# Patient Record
Sex: MALE | Race: WHITE | NOT HISPANIC OR LATINO | Employment: OTHER | URBAN - METROPOLITAN AREA
[De-identification: names, ages, dates, MRNs, and addresses within clinical notes are randomized per-mention and may not be internally consistent; named-entity substitution may affect disease eponyms.]

---

## 2023-10-11 RX ORDER — FAMOTIDINE 20 MG/1
1 TABLET, FILM COATED ORAL 2 TIMES DAILY
COMMUNITY
Start: 2022-07-20

## 2023-10-11 RX ORDER — PREDNISONE 20 MG/1
1 TABLET ORAL 2 TIMES DAILY
COMMUNITY
Start: 2022-07-20

## 2023-10-11 RX ORDER — EPINEPHRINE 0.3 MG/.3ML
0.3 INJECTION, SOLUTION INTRAMUSCULAR ONCE
COMMUNITY
Start: 2022-07-20

## 2023-10-11 RX ORDER — DOXYCYCLINE 100 MG/1
1 TABLET ORAL 2 TIMES DAILY
COMMUNITY
Start: 2022-08-09

## 2023-10-12 ENCOUNTER — OFFICE VISIT (OUTPATIENT)
Dept: ORTHOPEDIC SURGERY | Facility: CLINIC | Age: 68
End: 2023-10-12
Payer: MEDICARE

## 2023-10-12 VITALS — WEIGHT: 168 LBS | HEIGHT: 72 IN | BODY MASS INDEX: 22.75 KG/M2

## 2023-10-12 DIAGNOSIS — S69.91XD INJURY OF RIGHT HAND, SUBSEQUENT ENCOUNTER: Primary | ICD-10-CM

## 2023-10-12 PROCEDURE — 1159F MED LIST DOCD IN RCRD: CPT | Performed by: ORTHOPAEDIC SURGERY

## 2023-10-12 PROCEDURE — 99213 OFFICE O/P EST LOW 20 MIN: CPT | Performed by: ORTHOPAEDIC SURGERY

## 2023-10-12 ASSESSMENT — PAIN - FUNCTIONAL ASSESSMENT: PAIN_FUNCTIONAL_ASSESSMENT: NO/DENIES PAIN

## 2023-10-13 NOTE — PROGRESS NOTES
History of Present Illness:  Chief Complaint   Patient presents with    Right Hand - Pain     Right index finger lac        Patient presents today for follow-up of right index finger wound.  He has been doing dressing changes regularly.  No drainage.  Pain improving.    History reviewed. No pertinent past medical history.    Medication Documentation Review Audit       Reviewed by Parisa Martinez CMA (Medical Assistant) on 10/12/23 at 1003      Medication Order Taking? Sig Documenting Provider Last Dose Status   doxycycline (Adoxa) 100 mg tablet [id number] No Take 1 tablet (100 mg) by mouth 2 times a day. Historical Provider, MD Not Taking Active   EPINEPHrine (Auvi-Q) 0.3 mg/0.3 mL injection syringe [id number] No Inject 0.3 mL (0.3 mg) into the shoulder, thigh, or buttocks 1 time. Historical Provider, MD Not Taking Active   famotidine (Pepcid) 20 mg tablet [id number] No Take 1 tablet (20 mg) by mouth twice a day. Historical Provider, MD Not Taking Active   predniSONE (Deltasone) 20 mg tablet [id number] No Take 1 tablet (20 mg) by mouth twice a day. Historical Provider, MD Not Taking Active                    No Known Allergies    Social History     Socioeconomic History    Marital status:      Spouse name: Not on file    Number of children: Not on file    Years of education: Not on file    Highest education level: Not on file   Occupational History    Not on file   Tobacco Use    Smoking status: Not on file    Smokeless tobacco: Not on file   Substance and Sexual Activity    Alcohol use: Not on file    Drug use: Not on file    Sexual activity: Not on file   Other Topics Concern    Not on file   Social History Narrative    Not on file     Social Determinants of Health     Financial Resource Strain: Not on file   Food Insecurity: Not on file   Transportation Needs: Not on file   Physical Activity: Not on file   Stress: Not on file   Social Connections: Not on file   Intimate Partner Violence: Not on file   Housing  Stability: Not on file       History reviewed. No pertinent surgical history.     Review of Systems   GENERAL: Negative for malaise, significant weight loss, fever  MUSCULOSKELETAL: see HPI  NEURO:  Negative     Physical Examination  Constitutional: Appears well-developed and well-nourished.  Head: Normocephalic and atraumatic.  Eyes: EOMI grossly  Cardiovascular: Intact distal pulses.   Respiratory: Effort normal. No respiratory distress.  Neurologic: Alert and oriented to person, place, and time.  Skin: Skin is warm and dry.  Hematologic / Lymphatic: No lymphedema, lymphangitis.  Psychiatric: normal mood and affect. Behavior is normal.   Musculoskeletal:  Right index finger: Wound is well-healing with good granulation tissue centrally and evidence of epithelialization peripherally.  Capillary refill less than 2 seconds distally.  0 cm DPC.  FDS/FDP and terminal extensor intact.     Assessment:  Patient with healing right index finger wound     Plan:  Right index finger wound is healing well.  No signs of infection.  We discussed recommendation for moisturizing lotion like Aquaphor to be applied sparingly a few times a day.  He will continue use of bandage when more active, but otherwise does not need a bandage at this point.  If any issues or concerns with the healing process they will call for further follow-up.

## 2024-04-22 ENCOUNTER — LAB (OUTPATIENT)
Dept: LAB | Facility: LAB | Age: 69
End: 2024-04-22
Payer: MEDICARE

## 2024-04-22 DIAGNOSIS — E78.2 MIXED HYPERLIPIDEMIA: Primary | ICD-10-CM

## 2024-04-22 DIAGNOSIS — R97.20 ELEVATED PROSTATE SPECIFIC ANTIGEN (PSA): ICD-10-CM

## 2024-04-22 LAB
ALBUMIN SERPL BCP-MCNC: 4.4 G/DL (ref 3.4–5)
ALP SERPL-CCNC: 88 U/L (ref 33–136)
ALT SERPL W P-5'-P-CCNC: 16 U/L (ref 10–52)
ANION GAP SERPL CALC-SCNC: 10 MMOL/L (ref 10–20)
AST SERPL W P-5'-P-CCNC: 23 U/L (ref 9–39)
BASOPHILS # BLD AUTO: 0.06 X10*3/UL (ref 0–0.1)
BASOPHILS NFR BLD AUTO: 0.7 %
BILIRUB SERPL-MCNC: 0.7 MG/DL (ref 0–1.2)
BUN SERPL-MCNC: 14 MG/DL (ref 6–23)
CALCIUM SERPL-MCNC: 9.6 MG/DL (ref 8.6–10.3)
CHLORIDE SERPL-SCNC: 107 MMOL/L (ref 98–107)
CHOLEST SERPL-MCNC: 203 MG/DL (ref 0–199)
CHOLESTEROL/HDL RATIO: 3.1
CO2 SERPL-SCNC: 29 MMOL/L (ref 21–32)
CREAT SERPL-MCNC: 1.06 MG/DL (ref 0.5–1.3)
EGFRCR SERPLBLD CKD-EPI 2021: 76 ML/MIN/1.73M*2
EOSINOPHIL # BLD AUTO: 0.16 X10*3/UL (ref 0–0.7)
EOSINOPHIL NFR BLD AUTO: 1.9 %
ERYTHROCYTE [DISTWIDTH] IN BLOOD BY AUTOMATED COUNT: 13 % (ref 11.5–14.5)
GLUCOSE SERPL-MCNC: 86 MG/DL (ref 74–99)
HCT VFR BLD AUTO: 49.9 % (ref 41–52)
HDLC SERPL-MCNC: 65.2 MG/DL
HGB BLD-MCNC: 16.2 G/DL (ref 13.5–17.5)
IMM GRANULOCYTES # BLD AUTO: 0.02 X10*3/UL (ref 0–0.7)
IMM GRANULOCYTES NFR BLD AUTO: 0.2 % (ref 0–0.9)
LDLC SERPL CALC-MCNC: 126 MG/DL
LYMPHOCYTES # BLD AUTO: 2.87 X10*3/UL (ref 1.2–4.8)
LYMPHOCYTES NFR BLD AUTO: 34.7 %
MCH RBC QN AUTO: 31 PG (ref 26–34)
MCHC RBC AUTO-ENTMCNC: 32.5 G/DL (ref 32–36)
MCV RBC AUTO: 96 FL (ref 80–100)
MONOCYTES # BLD AUTO: 0.68 X10*3/UL (ref 0.1–1)
MONOCYTES NFR BLD AUTO: 8.2 %
NEUTROPHILS # BLD AUTO: 4.48 X10*3/UL (ref 1.2–7.7)
NEUTROPHILS NFR BLD AUTO: 54.3 %
NON HDL CHOLESTEROL: 138 MG/DL (ref 0–149)
NRBC BLD-RTO: 0 /100 WBCS (ref 0–0)
PLATELET # BLD AUTO: 292 X10*3/UL (ref 150–450)
POTASSIUM SERPL-SCNC: 5 MMOL/L (ref 3.5–5.3)
PROT SERPL-MCNC: 6.7 G/DL (ref 6.4–8.2)
RBC # BLD AUTO: 5.22 X10*6/UL (ref 4.5–5.9)
SODIUM SERPL-SCNC: 141 MMOL/L (ref 136–145)
TRIGL SERPL-MCNC: 60 MG/DL (ref 0–149)
TSH SERPL-ACNC: 2.64 MIU/L (ref 0.44–3.98)
VLDL: 12 MG/DL (ref 0–40)
WBC # BLD AUTO: 8.3 X10*3/UL (ref 4.4–11.3)

## 2024-04-22 PROCEDURE — 80053 COMPREHEN METABOLIC PANEL: CPT

## 2024-04-22 PROCEDURE — 80061 LIPID PANEL: CPT

## 2024-04-22 PROCEDURE — 84153 ASSAY OF PSA TOTAL: CPT

## 2024-04-22 PROCEDURE — 85025 COMPLETE CBC W/AUTO DIFF WBC: CPT

## 2024-04-22 PROCEDURE — 84443 ASSAY THYROID STIM HORMONE: CPT

## 2024-04-22 PROCEDURE — 36415 COLL VENOUS BLD VENIPUNCTURE: CPT

## 2024-04-23 LAB — PSA SERPL-MCNC: 1.16 NG/ML

## 2025-05-28 ENCOUNTER — APPOINTMENT (OUTPATIENT)
Dept: CARDIOLOGY | Facility: HOSPITAL | Age: 70
End: 2025-05-28
Payer: MEDICARE

## 2025-05-28 ENCOUNTER — HOSPITAL ENCOUNTER (OUTPATIENT)
Facility: HOSPITAL | Age: 70
Setting detail: OBSERVATION
Discharge: HOME | End: 2025-05-29
Attending: EMERGENCY MEDICINE | Admitting: NURSE PRACTITIONER
Payer: MEDICARE

## 2025-05-28 ENCOUNTER — APPOINTMENT (OUTPATIENT)
Dept: RADIOLOGY | Facility: HOSPITAL | Age: 70
End: 2025-05-28
Payer: MEDICARE

## 2025-05-28 DIAGNOSIS — S40.011A CONTUSION OF RIGHT SHOULDER, INITIAL ENCOUNTER: ICD-10-CM

## 2025-05-28 DIAGNOSIS — R07.9 ACUTE CHEST PAIN: ICD-10-CM

## 2025-05-28 DIAGNOSIS — I48.91 NEW ONSET ATRIAL FIBRILLATION (MULTI): ICD-10-CM

## 2025-05-28 DIAGNOSIS — S09.90XA INJURY OF HEAD, INITIAL ENCOUNTER: ICD-10-CM

## 2025-05-28 DIAGNOSIS — R55 SYNCOPE, UNSPECIFIED SYNCOPE TYPE: Primary | ICD-10-CM

## 2025-05-28 LAB
ALBUMIN SERPL BCP-MCNC: 4.4 G/DL (ref 3.4–5)
ALP SERPL-CCNC: 84 U/L (ref 33–136)
ALT SERPL W P-5'-P-CCNC: 17 U/L (ref 10–52)
ANION GAP SERPL CALC-SCNC: 9 MMOL/L (ref 10–20)
AORTIC VALVE PEAK VELOCITY: 1.21 M/S
APTT PPP: 30 SECONDS (ref 26–36)
AST SERPL W P-5'-P-CCNC: 24 U/L (ref 9–39)
ATRIAL RATE: 111 BPM
ATRIAL RATE: 150 BPM
ATRIAL RATE: 77 BPM
AV PEAK GRADIENT: 6 MMHG
AVA (PEAK VEL): 2.47 CM2
BASOPHILS # BLD AUTO: 0.06 X10*3/UL (ref 0–0.1)
BASOPHILS NFR BLD AUTO: 0.7 %
BILIRUB SERPL-MCNC: 0.7 MG/DL (ref 0–1.2)
BUN SERPL-MCNC: 22 MG/DL (ref 6–23)
CALCIUM SERPL-MCNC: 9.3 MG/DL (ref 8.6–10.3)
CARDIAC TROPONIN I PNL SERPL HS: 5 NG/L (ref 0–20)
CARDIAC TROPONIN I PNL SERPL HS: 5 NG/L (ref 0–20)
CHLORIDE SERPL-SCNC: 106 MMOL/L (ref 98–107)
CO2 SERPL-SCNC: 28 MMOL/L (ref 21–32)
CREAT SERPL-MCNC: 1.13 MG/DL (ref 0.5–1.3)
EGFRCR SERPLBLD CKD-EPI 2021: 70 ML/MIN/1.73M*2
EJECTION FRACTION APICAL 4 CHAMBER: 65.2
EJECTION FRACTION: 68 %
EOSINOPHIL # BLD AUTO: 0.15 X10*3/UL (ref 0–0.7)
EOSINOPHIL NFR BLD AUTO: 1.7 %
ERYTHROCYTE [DISTWIDTH] IN BLOOD BY AUTOMATED COUNT: 12.9 % (ref 11.5–14.5)
GLUCOSE SERPL-MCNC: 109 MG/DL (ref 74–99)
HCT VFR BLD AUTO: 47.4 % (ref 41–52)
HGB BLD-MCNC: 15.7 G/DL (ref 13.5–17.5)
HOLD SPECIMEN: NORMAL
IMM GRANULOCYTES # BLD AUTO: 0.04 X10*3/UL (ref 0–0.7)
IMM GRANULOCYTES NFR BLD AUTO: 0.5 % (ref 0–0.9)
INR PPP: 1 (ref 0.9–1.1)
LEFT ATRIUM VOLUME AREA LENGTH INDEX BSA: 22.9 ML/M2
LEFT VENTRICLE INTERNAL DIMENSION DIASTOLE: 4.44 CM (ref 3.5–6)
LEFT VENTRICULAR OUTFLOW TRACT DIAMETER: 1.89 CM
LYMPHOCYTES # BLD AUTO: 4.84 X10*3/UL (ref 1.2–4.8)
LYMPHOCYTES NFR BLD AUTO: 54.6 %
MAGNESIUM SERPL-MCNC: 1.89 MG/DL (ref 1.6–2.4)
MCH RBC QN AUTO: 31.1 PG (ref 26–34)
MCHC RBC AUTO-ENTMCNC: 33.1 G/DL (ref 32–36)
MCV RBC AUTO: 94 FL (ref 80–100)
MITRAL VALVE E/A RATIO: 1.19
MONOCYTES # BLD AUTO: 0.7 X10*3/UL (ref 0.1–1)
MONOCYTES NFR BLD AUTO: 7.9 %
NEUTROPHILS # BLD AUTO: 3.07 X10*3/UL (ref 1.2–7.7)
NEUTROPHILS NFR BLD AUTO: 34.6 %
NRBC BLD-RTO: 0 /100 WBCS (ref 0–0)
P AXIS: 74 DEGREES
P OFFSET: 202 MS
P ONSET: 135 MS
PLATELET # BLD AUTO: 262 X10*3/UL (ref 150–450)
POTASSIUM SERPL-SCNC: 3.5 MMOL/L (ref 3.5–5.3)
PR INTERVAL: 164 MS
PROT SERPL-MCNC: 6.3 G/DL (ref 6.4–8.2)
PROTHROMBIN TIME: 10.9 SECONDS (ref 9.8–12.4)
Q ONSET: 217 MS
QRS COUNT: 12 BEATS
QRS COUNT: 17 BEATS
QRS COUNT: 19 BEATS
QRS DURATION: 100 MS
QRS DURATION: 100 MS
QRS DURATION: 104 MS
QT INTERVAL: 294 MS
QT INTERVAL: 334 MS
QT INTERVAL: 370 MS
QTC CALCULATION(BAZETT): 397 MS
QTC CALCULATION(BAZETT): 418 MS
QTC CALCULATION(BAZETT): 439 MS
QTC FREDERICIA: 360 MS
QTC FREDERICIA: 401 MS
QTC FREDERICIA: 402 MS
R AXIS: 81 DEGREES
R AXIS: 82 DEGREES
R AXIS: 84 DEGREES
RBC # BLD AUTO: 5.05 X10*6/UL (ref 4.5–5.9)
RIGHT VENTRICLE FREE WALL PEAK S': 13 CM/S
RIGHT VENTRICLE PEAK SYSTOLIC PRESSURE: 30 MMHG
SODIUM SERPL-SCNC: 139 MMOL/L (ref 136–145)
T AXIS: 37 DEGREES
T AXIS: 60 DEGREES
T AXIS: 67 DEGREES
T OFFSET: 364 MS
T OFFSET: 384 MS
T OFFSET: 402 MS
T4 FREE SERPL-MCNC: 0.79 NG/DL (ref 0.61–1.12)
TRICUSPID ANNULAR PLANE SYSTOLIC EXCURSION: 2.7 CM
TSH SERPL-ACNC: 5.3 MIU/L (ref 0.44–3.98)
VENTRICULAR RATE: 104 BPM
VENTRICULAR RATE: 110 BPM
VENTRICULAR RATE: 77 BPM
WBC # BLD AUTO: 8.9 X10*3/UL (ref 4.4–11.3)

## 2025-05-28 PROCEDURE — 71045 X-RAY EXAM CHEST 1 VIEW: CPT

## 2025-05-28 PROCEDURE — 2500000004 HC RX 250 GENERAL PHARMACY W/ HCPCS (ALT 636 FOR OP/ED): Mod: JZ | Performed by: NURSE PRACTITIONER

## 2025-05-28 PROCEDURE — 73030 X-RAY EXAM OF SHOULDER: CPT | Mod: RT

## 2025-05-28 PROCEDURE — 99285 EMERGENCY DEPT VISIT HI MDM: CPT | Mod: 25 | Performed by: EMERGENCY MEDICINE

## 2025-05-28 PROCEDURE — 70450 CT HEAD/BRAIN W/O DYE: CPT | Performed by: RADIOLOGY

## 2025-05-28 PROCEDURE — 93005 ELECTROCARDIOGRAM TRACING: CPT

## 2025-05-28 PROCEDURE — 84439 ASSAY OF FREE THYROXINE: CPT | Performed by: EMERGENCY MEDICINE

## 2025-05-28 PROCEDURE — 85025 COMPLETE CBC W/AUTO DIFF WBC: CPT | Performed by: EMERGENCY MEDICINE

## 2025-05-28 PROCEDURE — 94760 N-INVAS EAR/PLS OXIMETRY 1: CPT

## 2025-05-28 PROCEDURE — 93306 TTE W/DOPPLER COMPLETE: CPT

## 2025-05-28 PROCEDURE — 2500000001 HC RX 250 WO HCPCS SELF ADMINISTERED DRUGS (ALT 637 FOR MEDICARE OP): Performed by: EMERGENCY MEDICINE

## 2025-05-28 PROCEDURE — 76536 US EXAM OF HEAD AND NECK: CPT | Performed by: RADIOLOGY

## 2025-05-28 PROCEDURE — 71045 X-RAY EXAM CHEST 1 VIEW: CPT | Mod: RIGHT SIDE | Performed by: RADIOLOGY

## 2025-05-28 PROCEDURE — 80053 COMPREHEN METABOLIC PANEL: CPT | Performed by: EMERGENCY MEDICINE

## 2025-05-28 PROCEDURE — 84443 ASSAY THYROID STIM HORMONE: CPT | Performed by: EMERGENCY MEDICINE

## 2025-05-28 PROCEDURE — G0378 HOSPITAL OBSERVATION PER HR: HCPCS

## 2025-05-28 PROCEDURE — 83036 HEMOGLOBIN GLYCOSYLATED A1C: CPT | Mod: CONLAB | Performed by: PHYSICIAN ASSISTANT

## 2025-05-28 PROCEDURE — 36415 COLL VENOUS BLD VENIPUNCTURE: CPT | Performed by: EMERGENCY MEDICINE

## 2025-05-28 PROCEDURE — 99222 1ST HOSP IP/OBS MODERATE 55: CPT | Performed by: NURSE PRACTITIONER

## 2025-05-28 PROCEDURE — 73030 X-RAY EXAM OF SHOULDER: CPT | Mod: RIGHT SIDE | Performed by: RADIOLOGY

## 2025-05-28 PROCEDURE — 85610 PROTHROMBIN TIME: CPT | Performed by: EMERGENCY MEDICINE

## 2025-05-28 PROCEDURE — 84484 ASSAY OF TROPONIN QUANT: CPT | Performed by: EMERGENCY MEDICINE

## 2025-05-28 PROCEDURE — 83735 ASSAY OF MAGNESIUM: CPT | Performed by: EMERGENCY MEDICINE

## 2025-05-28 PROCEDURE — 93306 TTE W/DOPPLER COMPLETE: CPT | Performed by: INTERNAL MEDICINE

## 2025-05-28 PROCEDURE — 70450 CT HEAD/BRAIN W/O DYE: CPT

## 2025-05-28 PROCEDURE — 85730 THROMBOPLASTIN TIME PARTIAL: CPT | Performed by: EMERGENCY MEDICINE

## 2025-05-28 PROCEDURE — 2500000004 HC RX 250 GENERAL PHARMACY W/ HCPCS (ALT 636 FOR OP/ED): Mod: JZ | Performed by: EMERGENCY MEDICINE

## 2025-05-28 PROCEDURE — 96372 THER/PROPH/DIAG INJ SC/IM: CPT | Performed by: NURSE PRACTITIONER

## 2025-05-28 PROCEDURE — 76536 US EXAM OF HEAD AND NECK: CPT

## 2025-05-28 PROCEDURE — 96375 TX/PRO/DX INJ NEW DRUG ADDON: CPT

## 2025-05-28 PROCEDURE — 96374 THER/PROPH/DIAG INJ IV PUSH: CPT | Mod: 59

## 2025-05-28 RX ORDER — ONDANSETRON 4 MG/1
4 TABLET, ORALLY DISINTEGRATING ORAL EVERY 8 HOURS PRN
Status: DISCONTINUED | OUTPATIENT
Start: 2025-05-28 | End: 2025-05-29 | Stop reason: HOSPADM

## 2025-05-28 RX ORDER — ACETAMINOPHEN 325 MG/1
650 TABLET ORAL EVERY 6 HOURS PRN
Status: DISCONTINUED | OUTPATIENT
Start: 2025-05-28 | End: 2025-05-29 | Stop reason: HOSPADM

## 2025-05-28 RX ORDER — NAPROXEN SODIUM 220 MG/1
162 TABLET, FILM COATED ORAL ONCE
Status: COMPLETED | OUTPATIENT
Start: 2025-05-28 | End: 2025-05-28

## 2025-05-28 RX ORDER — ENOXAPARIN SODIUM 100 MG/ML
40 INJECTION SUBCUTANEOUS EVERY 24 HOURS
Status: DISCONTINUED | OUTPATIENT
Start: 2025-05-28 | End: 2025-05-29

## 2025-05-28 RX ORDER — POLYETHYLENE GLYCOL 3350 17 G/17G
17 POWDER, FOR SOLUTION ORAL DAILY
Status: DISCONTINUED | OUTPATIENT
Start: 2025-05-28 | End: 2025-05-29 | Stop reason: HOSPADM

## 2025-05-28 RX ORDER — ONDANSETRON HYDROCHLORIDE 2 MG/ML
4 INJECTION, SOLUTION INTRAVENOUS EVERY 8 HOURS PRN
Status: DISCONTINUED | OUTPATIENT
Start: 2025-05-28 | End: 2025-05-29 | Stop reason: HOSPADM

## 2025-05-28 RX ORDER — ONDANSETRON HYDROCHLORIDE 2 MG/ML
4 INJECTION, SOLUTION INTRAVENOUS ONCE
Status: COMPLETED | OUTPATIENT
Start: 2025-05-28 | End: 2025-05-28

## 2025-05-28 RX ORDER — CALCIUM CARBONATE 200(500)MG
500 TABLET,CHEWABLE ORAL 4 TIMES DAILY PRN
Status: DISCONTINUED | OUTPATIENT
Start: 2025-05-28 | End: 2025-05-29 | Stop reason: HOSPADM

## 2025-05-28 RX ORDER — MORPHINE SULFATE 4 MG/ML
4 INJECTION INTRAVENOUS ONCE
Status: COMPLETED | OUTPATIENT
Start: 2025-05-28 | End: 2025-05-28

## 2025-05-28 RX ADMIN — ASPIRIN 162 MG: 81 TABLET, CHEWABLE ORAL at 06:17

## 2025-05-28 RX ADMIN — MORPHINE SULFATE 4 MG: 4 INJECTION, SOLUTION INTRAMUSCULAR; INTRAVENOUS at 05:34

## 2025-05-28 RX ADMIN — ONDANSETRON 4 MG: 2 INJECTION INTRAMUSCULAR; INTRAVENOUS at 05:33

## 2025-05-28 RX ADMIN — ENOXAPARIN SODIUM 40 MG: 40 INJECTION SUBCUTANEOUS at 20:16

## 2025-05-28 SDOH — SOCIAL STABILITY: SOCIAL INSECURITY: DO YOU FEEL ANYONE HAS EXPLOITED OR TAKEN ADVANTAGE OF YOU FINANCIALLY OR OF YOUR PERSONAL PROPERTY?: NO

## 2025-05-28 SDOH — SOCIAL STABILITY: SOCIAL INSECURITY: WITHIN THE LAST YEAR, HAVE YOU BEEN AFRAID OF YOUR PARTNER OR EX-PARTNER?: NO

## 2025-05-28 SDOH — SOCIAL STABILITY: SOCIAL INSECURITY
WITHIN THE LAST YEAR, HAVE YOU BEEN KICKED, HIT, SLAPPED, OR OTHERWISE PHYSICALLY HURT BY YOUR PARTNER OR EX-PARTNER?: NO

## 2025-05-28 SDOH — ECONOMIC STABILITY: INCOME INSECURITY: IN THE PAST 12 MONTHS HAS THE ELECTRIC, GAS, OIL, OR WATER COMPANY THREATENED TO SHUT OFF SERVICES IN YOUR HOME?: NO

## 2025-05-28 SDOH — SOCIAL STABILITY: SOCIAL INSECURITY: ARE YOU OR HAVE YOU BEEN THREATENED OR ABUSED PHYSICALLY, EMOTIONALLY, OR SEXUALLY BY ANYONE?: NO

## 2025-05-28 SDOH — SOCIAL STABILITY: SOCIAL INSECURITY: DOES ANYONE TRY TO KEEP YOU FROM HAVING/CONTACTING OTHER FRIENDS OR DOING THINGS OUTSIDE YOUR HOME?: NO

## 2025-05-28 SDOH — ECONOMIC STABILITY: FOOD INSECURITY: WITHIN THE PAST 12 MONTHS, YOU WORRIED THAT YOUR FOOD WOULD RUN OUT BEFORE YOU GOT THE MONEY TO BUY MORE.: NEVER TRUE

## 2025-05-28 SDOH — SOCIAL STABILITY: SOCIAL INSECURITY: WITHIN THE LAST YEAR, HAVE YOU BEEN HUMILIATED OR EMOTIONALLY ABUSED IN OTHER WAYS BY YOUR PARTNER OR EX-PARTNER?: NO

## 2025-05-28 SDOH — SOCIAL STABILITY: SOCIAL INSECURITY
WITHIN THE LAST YEAR, HAVE YOU BEEN RAPED OR FORCED TO HAVE ANY KIND OF SEXUAL ACTIVITY BY YOUR PARTNER OR EX-PARTNER?: NO

## 2025-05-28 SDOH — ECONOMIC STABILITY: FOOD INSECURITY: WITHIN THE PAST 12 MONTHS, THE FOOD YOU BOUGHT JUST DIDN'T LAST AND YOU DIDN'T HAVE MONEY TO GET MORE.: NEVER TRUE

## 2025-05-28 SDOH — SOCIAL STABILITY: SOCIAL INSECURITY: ARE THERE ANY APPARENT SIGNS OF INJURIES/BEHAVIORS THAT COULD BE RELATED TO ABUSE/NEGLECT?: NO

## 2025-05-28 SDOH — SOCIAL STABILITY: SOCIAL INSECURITY: HAVE YOU HAD ANY THOUGHTS OF HARMING ANYONE ELSE?: NO

## 2025-05-28 SDOH — SOCIAL STABILITY: SOCIAL INSECURITY: HAS ANYONE EVER THREATENED TO HURT YOUR FAMILY OR YOUR PETS?: NO

## 2025-05-28 SDOH — SOCIAL STABILITY: SOCIAL INSECURITY: DO YOU FEEL UNSAFE GOING BACK TO THE PLACE WHERE YOU ARE LIVING?: NO

## 2025-05-28 SDOH — SOCIAL STABILITY: SOCIAL INSECURITY: ABUSE: ADULT

## 2025-05-28 SDOH — SOCIAL STABILITY: SOCIAL INSECURITY: HAVE YOU HAD THOUGHTS OF HARMING ANYONE ELSE?: NO

## 2025-05-28 SDOH — SOCIAL STABILITY: SOCIAL INSECURITY: WERE YOU ABLE TO COMPLETE ALL THE BEHAVIORAL HEALTH SCREENINGS?: YES

## 2025-05-28 ASSESSMENT — ACTIVITIES OF DAILY LIVING (ADL)
ADEQUATE_TO_COMPLETE_ADL: YES
PATIENT'S MEMORY ADEQUATE TO SAFELY COMPLETE DAILY ACTIVITIES?: YES
BATHING: INDEPENDENT
HEARING - LEFT EAR: FUNCTIONAL
GROOMING: INDEPENDENT
HEARING - RIGHT EAR: FUNCTIONAL
LACK_OF_TRANSPORTATION: NO
TOILETING: INDEPENDENT
WALKS IN HOME: INDEPENDENT
JUDGMENT_ADEQUATE_SAFELY_COMPLETE_DAILY_ACTIVITIES: YES
FEEDING YOURSELF: INDEPENDENT
DRESSING YOURSELF: INDEPENDENT

## 2025-05-28 ASSESSMENT — PAIN SCALES - GENERAL
PAINLEVEL_OUTOF10: 3
PAINLEVEL_OUTOF10: 5 - MODERATE PAIN
PAINLEVEL_OUTOF10: 5 - MODERATE PAIN
PAINLEVEL_OUTOF10: 2
PAINLEVEL_OUTOF10: 0 - NO PAIN
PAINLEVEL_OUTOF10: 2
PAINLEVEL_OUTOF10: 6
PAINLEVEL_OUTOF10: 0 - NO PAIN
PAINLEVEL_OUTOF10: 0 - NO PAIN
PAINLEVEL_OUTOF10: 2
PAINLEVEL_OUTOF10: 6
PAINLEVEL_OUTOF10: 6
PAINLEVEL_OUTOF10: 0 - NO PAIN
PAINLEVEL_OUTOF10: 3
PAINLEVEL_OUTOF10: 0 - NO PAIN

## 2025-05-28 ASSESSMENT — HEART SCORE
RISK FACTORS: 1-2 RISK FACTORS
TROPONIN: LESS THAN OR EQUAL TO NORMAL LIMIT
HEART SCORE: 4
ECG: NON-SPECIFIC REPOLARIZATION DISTURBANCE
HISTORY: SLIGHTLY SUSPICIOUS
AGE: 65+

## 2025-05-28 ASSESSMENT — LIFESTYLE VARIABLES
HOW MANY STANDARD DRINKS CONTAINING ALCOHOL DO YOU HAVE ON A TYPICAL DAY: PATIENT DOES NOT DRINK
HOW OFTEN DO YOU HAVE 6 OR MORE DRINKS ON ONE OCCASION: NEVER
AUDIT-C TOTAL SCORE: 0
HOW OFTEN DO YOU HAVE A DRINK CONTAINING ALCOHOL: NEVER
SKIP TO QUESTIONS 9-10: 1
AUDIT-C TOTAL SCORE: 0

## 2025-05-28 ASSESSMENT — PAIN DESCRIPTION - FREQUENCY
FREQUENCY: CONSTANT/CONTINUOUS
FREQUENCY: CONSTANT/CONTINUOUS

## 2025-05-28 ASSESSMENT — COLUMBIA-SUICIDE SEVERITY RATING SCALE - C-SSRS
6. HAVE YOU EVER DONE ANYTHING, STARTED TO DO ANYTHING, OR PREPARED TO DO ANYTHING TO END YOUR LIFE?: NO
2. HAVE YOU ACTUALLY HAD ANY THOUGHTS OF KILLING YOURSELF?: NO
1. IN THE PAST MONTH, HAVE YOU WISHED YOU WERE DEAD OR WISHED YOU COULD GO TO SLEEP AND NOT WAKE UP?: NO

## 2025-05-28 ASSESSMENT — COGNITIVE AND FUNCTIONAL STATUS - GENERAL
MOBILITY SCORE: 24
PATIENT BASELINE BEDBOUND: NO
DAILY ACTIVITIY SCORE: 24
DAILY ACTIVITIY SCORE: 24
MOBILITY SCORE: 24

## 2025-05-28 ASSESSMENT — PAIN DESCRIPTION - DESCRIPTORS
DESCRIPTORS: ACHING
DESCRIPTORS: SORE

## 2025-05-28 ASSESSMENT — PAIN DESCRIPTION - PROGRESSION: CLINICAL_PROGRESSION: GRADUALLY IMPROVING

## 2025-05-28 ASSESSMENT — PAIN DESCRIPTION - LOCATION
LOCATION: SHOULDER
LOCATION: SHOULDER

## 2025-05-28 ASSESSMENT — PAIN - FUNCTIONAL ASSESSMENT
PAIN_FUNCTIONAL_ASSESSMENT: 0-10

## 2025-05-28 ASSESSMENT — PATIENT HEALTH QUESTIONNAIRE - PHQ9
2. FEELING DOWN, DEPRESSED OR HOPELESS: NOT AT ALL
SUM OF ALL RESPONSES TO PHQ9 QUESTIONS 1 & 2: 0
1. LITTLE INTEREST OR PLEASURE IN DOING THINGS: NOT AT ALL

## 2025-05-28 ASSESSMENT — PAIN DESCRIPTION - PAIN TYPE
TYPE: ACUTE PAIN
TYPE: ACUTE PAIN

## 2025-05-28 ASSESSMENT — PAIN DESCRIPTION - ORIENTATION
ORIENTATION: RIGHT
ORIENTATION: RIGHT

## 2025-05-28 NOTE — PROGRESS NOTES
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 Yes   Type of Animals or Pets dog and cat   Home or Post Acute Services None   Expected Discharge Disposition Home   Does the patient need discharge transport arranged? No     Diagnosis: syncope    Patient Concerns: None    Patient Needs: None

## 2025-05-28 NOTE — ED PROVIDER NOTES
HPI   Chief Complaint   Patient presents with   • Chest Pain   • Syncope   • Shoulder Pain         History provided by:  Medical records and patient   used: No        This patient presents to the emergency department ambulatory via private vehicle for evaluation after syncopal episode at home.  Patient reports that he had fallen asleep on the couch and he woke up to use the bathroom.  He said he started to walk to the bathroom started feel hot and sweaty and then woke up on the floor.  He says since then he has noticed the pain in his right shoulder and right side of his chest that feels musculoskeletal to him.  He sustained a laceration to his forehead with the fall.  His tetanus is up-to-date.    Patient denies any nausea, vomiting.  No recent fevers, chills, coughs, URI symptoms.  He is not on any blood thinners.  Patient states he takes no medications on a regular basis.  He did take 2 baby aspirin prior to coming to the hospital.    Patient History   Medical History[1]  Surgical History[2]  Family History[3]  Social History[4]    Physical Exam   ED Triage Vitals [05/28/25 0414]   Temperature Heart Rate Respirations BP   36.4 °C (97.6 °F) (!) 110 18 (!) 130/92      SpO2 Temp Source Heart Rate Source Patient Position   100 % Temporal Monitor --      BP Location FiO2 (%)     -- --       Physical Exam  Vitals reviewed.   Constitutional:       Appearance: He is well-developed.   HENT:      Head: Normocephalic.      Comments: No raccoon's eyes, no Ramirez sign.  Superficial laceration left forehead.  No hematoma.  No active bleeding.  Abrasion nasal bridge.  Eyes:      Extraocular Movements: Extraocular movements intact.      Pupils: Pupils are equal, round, and reactive to light.      Comments: No orbital rim crepitance, step-off, deformity   Cardiovascular:      Rate and Rhythm: Tachycardia present. Rhythm irregular.      Pulses:           Carotid pulses are 2+ on the right side and 2+ on the  left side.       Radial pulses are 2+ on the right side and 2+ on the left side.        Dorsalis pedis pulses are 2+ on the right side and 2+ on the left side.        Posterior tibial pulses are 2+ on the right side and 2+ on the left side.      Heart sounds: Normal heart sounds.   Pulmonary:      Effort: Pulmonary effort is normal.      Breath sounds: Normal breath sounds.   Chest:      Chest wall: Tenderness present.      Comments: Right upper chest  Abdominal:      General: Bowel sounds are normal.      Palpations: Abdomen is soft.   Musculoskeletal:         General: Normal range of motion.      Cervical back: Normal range of motion and neck supple.      Right lower leg: No edema.      Left lower leg: No edema.      Comments: Tender right shoulder.  No acromioclavicular step-off or deformity.   Skin:     General: Skin is warm and dry.      Capillary Refill: Capillary refill takes less than 2 seconds.   Neurological:      General: No focal deficit present.      Mental Status: He is alert and oriented to person, place, and time.      Cranial Nerves: No cranial nerve deficit.      Motor: No weakness.   Psychiatric:         Mood and Affect: Mood normal.           ED Course & MDM   ED Course as of 05/28/25 0714   Wed May 28, 2025   0547 Patient reassessed, feeling better, shoulder just sore with movement   [MN]      ED Course User Index  [MN] Henny Mack MD         Diagnoses as of 05/28/25 0714   Syncope, unspecified syncope type   New onset atrial fibrillation (Multi)   Acute chest pain   Injury of head, initial encounter   Contusion of right shoulder, initial encounter          ED Medication Administration from 05/28/2025 0405 to 05/28/2025 0555         Date/Time Order Dose Route Action Action by     05/28/2025 0533 EDT ondansetron (Zofran) injection 4 mg 4 mg intravenous Given ANALY Joy     05/28/2025 0534 EDT morphine injection 4 mg 4 mg intravenous Given ANALY Joy          Labs Reviewed   CBC WITH AUTO  DIFFERENTIAL - Abnormal       Result Value    WBC 8.9      nRBC 0.0      RBC 5.05      Hemoglobin 15.7      Hematocrit 47.4      MCV 94      MCH 31.1      MCHC 33.1      RDW 12.9      Platelets 262      Neutrophils % 34.6      Immature Granulocytes %, Automated 0.5      Lymphocytes % 54.6      Monocytes % 7.9      Eosinophils % 1.7      Basophils % 0.7      Neutrophils Absolute 3.07      Immature Granulocytes Absolute, Automated 0.04      Lymphocytes Absolute 4.84 (*)     Monocytes Absolute 0.70      Eosinophils Absolute 0.15      Basophils Absolute 0.06     COMPREHENSIVE METABOLIC PANEL - Abnormal    Glucose 109 (*)     Sodium 139      Potassium 3.5      Chloride 106      Bicarbonate 28      Anion Gap 9 (*)     Urea Nitrogen 22      Creatinine 1.13      eGFR 70      Calcium 9.3      Albumin 4.4      Alkaline Phosphatase 84      Total Protein 6.3 (*)     AST 24      Bilirubin, Total 0.7      ALT 17     TSH WITH REFLEX TO FREE T4 IF ABNORMAL - Abnormal    Thyroid Stimulating Hormone 5.30 (*)     Narrative:     TSH testing is performed using different testing methodology at [facility] than at other [facility]. Direct result comparisons should only be made within the same method.     SERIAL TROPONIN-INITIAL - Normal    Troponin I, High Sensitivity 5      Narrative:     Less than 99th percentile of normal range cutoff-  Female and children under 18 years old <14 ng/L; Male <21 ng/L: Negative  Repeat testing should be performed if clinically indicated.     Female and children under 18 years old 14-50 ng/L; Male 21-50 ng/L:  Consistent with possible cardiac damage and possible increased clinical   risk. Serial measurements may help to assess extent of myocardial damage.     >50 ng/L: Consistent with cardiac damage, increased clinical risk and  myocardial infarction. Serial measurements may help assess extent of   myocardial damage.      NOTE: Children less than 1 year old may have higher baseline  troponin   levels and results should be interpreted in conjunction with the overall   clinical context.     NOTE: Troponin I testing is performed using a different   testing methodology at [facility] than at other   [facility]. Direct result comparisons should only   be made within the same method.   PROTIME-INR - Normal    Protime 10.9      INR 1.0     APTT - Normal    aPTT 30      Narrative:     The APTT is no longer used for monitoring Unfractionated Heparin Therapy. For monitoring Heparin Therapy, use the Heparin Assay.   SERIAL TROPONIN, 1 HOUR - Normal    Troponin I, High Sensitivity 5      Narrative:     Less than 99th percentile of normal range cutoff-  Female and children under 18 years old <14 ng/L; Male <21 ng/L: Negative  Repeat testing should be performed if clinically indicated.     Female and children under 18 years old 14-50 ng/L; Male 21-50 ng/L:  Consistent with possible cardiac damage and possible increased clinical   risk. Serial measurements may help to assess extent of myocardial damage.     >50 ng/L: Consistent with cardiac damage, increased clinical risk and  myocardial infarction. Serial measurements may help assess extent of   myocardial damage.      NOTE: Children less than 1 year old may have higher baseline troponin   levels and results should be interpreted in conjunction with the overall   clinical context.     NOTE: Troponin I testing is performed using a different   testing methodology at [facility] than at other   [facility]. Direct result comparisons should only   be made within the same method.   MAGNESIUM - Normal    Magnesium 1.89     THYROXINE, FREE - Normal    Thyroxine, Free 0.79      Narrative:     Thyroxine Free testing is performed using different testing methodology at [facility] than at other [facility]. Direct result comparisons should only be made within the same method.    Biotin can cause falsely elevated  free T4 results. Patients taking a Biotin dose of up to 10 mg/day should refrain from taking Biotin for 24 hours before sample collection. Patient taking a Biotin dose of >10 mg/day should consult with their physician or the laboratory before the blood draw.   TROPONIN SERIES- (INITIAL, 1 HR)    Narrative:     The following orders were created for panel order Troponin I Series, High Sensitivity (0, 1 HR).  Procedure                               Abnormality         Status                     ---------                               -----------         ------                     Troponin I, High Sensitiv...[[id number]]  Normal              Final result               Troponin, High Sensitivit...[[id number]]  Normal              Final result                 Please view results for these tests on the individual orders.   GRAY TOP     CT head wo IV contrast   Final Result   1.  No acute intracranial hemorrhage or depressed calvarial fracture.                       MACRO:   None.        Signed by: Sophia Mosher 5/28/2025 6:53 AM   Dictation workstation:   [id number]      XR chest 1 view    (Results Pending)   XR shoulder right 2+ views    (Results Pending)   This patient presents to the emergency department with the above history and physical.  No signs of sepsis, dehydration, stroke, seizure           No data recorded     [place] Coma Scale Score: 15 (05/28/25 0419 : Stacey Joy RN)                   EKG  0411 --twelve-lead EKG was obtained and read by me. This demonstrates atrial fibrillation with heart rate of 110, incomplete right bundle branch block pattern,, no ischemia, no pericarditis. There was no   prior EKG.  0513 --twelve-lead EKG was obtained and read by me. This demonstrates atrial fibrillation with a rate of 104, no ischemia, no pericarditis. There was no change compared to most recent prior EKG.  0536 --twelve-lead EKG was obtained and read by me. This demonstrates normal sinus rhythm with a rate of 77,  normal intervals, normal axes, no ectopy, no ischemia, no pericarditis. Compared to most recent prior EKG, normal sinus rhythm has replaced atrial fibrillation.        Medical Decision Making  This patient presents to the emergency department with the above history and physical.  No signs of sepsis, dehydration, stroke, seizure.  Patient in atrial fibrillation with rapid ventricular response which is new according to patient.  He had already taken 2 baby aspirin at home was given additional 2 baby aspirin.  He was given morphine and Zofran for his complaints of pain.    Due to syncope with head injury CT scan of brain was obtained and read by radiology demonstrating no acute intracranial process.    Over the course of emergency department stay patient spontaneously converted to a normal sinus rhythm.  Troponin is negative x 2.  No significant electrolyte abnormalities.  TSH elevated but normal free T4    X-rays of chest and shoulder obtained due to reports of pain after the syncopal episode. These were also read by radiology demonstrating cardiomegaly, biapical pleural scarring, no acute fracture or dislocation of the right shoulder degenerative changes at the acromioclavicular joint.    Patient will require hospitalization for further evaluation and treatment.  Epic secure communication initiated with DIVINE Ramirez for Dr. Dsouza of the hospitalist service including patient's past medical history, presenting signs and symptoms, current clinical condition and test results. She has graciously accepted the patient.    Procedure  Procedures           [1]  History reviewed. No pertinent past medical history.  [2]  History reviewed. No pertinent surgical history.  [3]  No family history on file.  [4]  Social History  Tobacco Use   • Smoking status: Every Day     Current packs/day: 1.50     Types: Cigarettes   • Smokeless tobacco: Not on file   Substance Use Topics   • Alcohol use: Never   • Drug use: Never      Henny  STEVAN Mack MD  05/28/25 0714

## 2025-05-28 NOTE — CARE PLAN
The patient's goals for the shift include      The clinical goals for the shift include Patient's HR will remain <100 bpm    Patient progressing towards goals. Alert and oriented. Vitals stable. Sinus margarita. No complaints of chest pain, sob, dizziness. Continue to monitor. Call light within reach.

## 2025-05-28 NOTE — ED TRIAGE NOTES
Pt arrives to ER via private vehicle. Pt reports woke up to use the bathroom and as he was walking to the bathroom started to feel hot and sweaty. Pt states he then woke up on the floor. Pt states noticed right shoulder pain that radiates into his upper back and chest. Pt denies blood thinners. Pt has abrasion to forehead. Pt Aox4. MD michael in room with pt.

## 2025-05-28 NOTE — H&P
History of Present Illness  Titi Rowley is a 69 y.o. male  with PMHx significant for nicotine dependence who presented to [facility] due to syncope. Patient expressed that he was sleeping on the couch when he got up to go to the bathroom, became dizzy and then the next he knew, he woke up on the floor. He did strike his head causing a laceration and has right sided shoulder and rib pain that is reproducible with palpation and movement. Patient and female in the room expressed that he has been having intermittent fluttering feelings in his chest recently. Patient denies fever, chills, recent travel, or sick contacts. Patient currently does not take any medications and has not had a cardiac work up in about 20 years.   On arrival to ER, he was found to be in atrial fibrillation. He did self convert about 45 minutes after arrival without intervention. His Ed work up is benign including a CMP, CBC, TSH, CT head, CXR, and right shoulder x-ray. Monitor has been SB since conversion. Cardiology consult, ECHO, thyroid US ordered on admission.     12 Point ROS negative unless noted in above HPI     Past Medical History  Medical History[1]    Surgical History  Surgical History[2]     Social History  He reports that he has been smoking cigarettes. He does not have any smokeless tobacco history on file. He reports that he does not drink alcohol and does not use drugs.    Allergies  Patient has no known allergies.     Physical Exam  Constitutional:       Appearance: Normal appearance. He is not ill-appearing.   HENT:      Head: Atraumatic.      Nose: Nose normal.      Mouth/Throat:      Mouth: Mucous membranes are moist.   Eyes:      Pupils: Pupils are equal, round, and reactive to light.   Cardiovascular:      Rate and Rhythm: Normal rate and regular rhythm.   Pulmonary:      Effort: Pulmonary effort is normal.   Abdominal:      General: Bowel sounds are normal.      Palpations: Abdomen is soft.   Musculoskeletal:          General: Normal range of motion.   Skin:     General: Skin is warm and dry.   Neurological:      General: No focal deficit present.      Mental Status: He is alert and oriented to person, place, and time.   Psychiatric:         Mood and Affect: Mood normal.         Behavior: Behavior normal.          Last Recorded Vitals  Blood pressure 154/80, pulse 52, temperature 36.5 °C (97.7 °F), resp. rate 18, height 1.829 m (6'), weight 74.2 kg (163 lb 9.3 oz), SpO2 97%.    Relevant Results  Scheduled medications  Scheduled Medications[3]  Continuous medications  Continuous Medications[4]  PRN medications  PRN Medications[5]     Results for orders placed or performed during the hospital encounter of 05/28/25 (from the past 24 hours)   CBC with Differential   Result Value Ref Range    WBC 8.9 4.4 - 11.3 x10*3/uL    nRBC 0.0 0.0 - 0.0 /100 WBCs    RBC 5.05 4.50 - 5.90 x10*6/uL    Hemoglobin 15.7 13.5 - 17.5 g/dL    Hematocrit 47.4 41.0 - 52.0 %    MCV 94 80 - 100 fL    MCH 31.1 26.0 - 34.0 pg    MCHC 33.1 32.0 - 36.0 g/dL    RDW 12.9 11.5 - 14.5 %    Platelets 262 150 - 450 x10*3/uL    Neutrophils % 34.6 40.0 - 80.0 %    Immature Granulocytes %, Automated 0.5 0.0 - 0.9 %    Lymphocytes % 54.6 13.0 - 44.0 %    Monocytes % 7.9 2.0 - 10.0 %    Eosinophils % 1.7 0.0 - 6.0 %    Basophils % 0.7 0.0 - 2.0 %    Neutrophils Absolute 3.07 1.20 - 7.70 x10*3/uL    Immature Granulocytes Absolute, Automated 0.04 0.00 - 0.70 x10*3/uL    Lymphocytes Absolute 4.84 (H) 1.20 - 4.80 x10*3/uL    Monocytes Absolute 0.70 0.10 - 1.00 x10*3/uL    Eosinophils Absolute 0.15 0.00 - 0.70 x10*3/uL    Basophils Absolute 0.06 0.00 - 0.10 x10*3/uL   Comprehensive Metabolic Panel   Result Value Ref Range    Glucose 109 (H) 74 - 99 mg/dL    Sodium 139 136 - 145 mmol/L    Potassium 3.5 3.5 - 5.3 mmol/L    Chloride 106 98 - 107 mmol/L    Bicarbonate 28 21 - 32 mmol/L    Anion Gap 9 (L) 10 - 20 mmol/L    Urea Nitrogen 22 6 - 23 mg/dL    Creatinine 1.13 0.50 - 1.30  mg/dL    eGFR 70 >60 mL/min/1.73m*2    Calcium 9.3 8.6 - 10.3 mg/dL    Albumin 4.4 3.4 - 5.0 g/dL    Alkaline Phosphatase 84 33 - 136 U/L    Total Protein 6.3 (L) 6.4 - 8.2 g/dL    AST 24 9 - 39 U/L    Bilirubin, Total 0.7 0.0 - 1.2 mg/dL    ALT 17 10 - 52 U/L   Troponin I, High Sensitivity, Initial   Result Value Ref Range    Troponin I, High Sensitivity 5 0 - 20 ng/L   Protime-INR   Result Value Ref Range    Protime 10.9 9.8 - 12.4 seconds    INR 1.0 0.9 - 1.1   APTT   Result Value Ref Range    aPTT 30 26 - 36 seconds   Light Blue Top   Result Value Ref Range    Extra Tube Hold for add-ons.    Red Top   Result Value Ref Range    Extra Tube Hold for add-ons.    TSH with reflex to Free T4 if abnormal   Result Value Ref Range    Thyroid Stimulating Hormone 5.30 (H) 0.44 - 3.98 mIU/L   Magnesium   Result Value Ref Range    Magnesium 1.89 1.60 - 2.40 mg/dL   Thyroxine, Free   Result Value Ref Range    Thyroxine, Free 0.79 0.61 - 1.12 ng/dL   Gray Top   Result Value Ref Range    Extra Tube Hold for add-ons.    Troponin, High Sensitivity, 1 Hour   Result Value Ref Range    Troponin I, High Sensitivity 5 0 - 20 ng/L        Imaging  Imaging  XR chest 1 view  Result Date: 5/28/2025  1.  No radiographic evidence of acute cardiopulmonary process. 2. No acute fracture or dislocation at the right shoulder. Degenerative changes at the AC joint. Calcifications at the supraspinatus tendon, may be seen with calcific tendinitis.       MACRO: None.   Signed by: Sophia Mosher 5/28/2025 6:59 AM Dictation workstation:   [id number]    XR shoulder right 2+ views  Result Date: 5/28/2025  1.  No radiographic evidence of acute cardiopulmonary process. 2. No acute fracture or dislocation at the right shoulder. Degenerative changes at the AC joint. Calcifications at the supraspinatus tendon, may be seen with calcific tendinitis.       MACRO: None.   Signed by: Sophia Mosher 5/28/2025 6:59 AM Dictation workstation:   [id number]    CT  head wo IV contrast  Result Date: 5/28/2025  1.  No acute intracranial hemorrhage or depressed calvarial fracture.         MACRO: None.   Signed by: Sophia Mosher 5/28/2025 6:53 AM Dictation workstation:   [id number]      Cardiology, Vascular, and Other Imaging  No other imaging results found for the past 2 days       Assessment/Plan    Atrial fibrillation  ~notes increased fluttering in his chest that is intermittent  ~symptomatic with syncope  ~self converted to S.margarita  ~ECHO~~  ~thyroid US~~  ~cardiology consult appreciated    Right shoulder/chest pain  Abrasion left forehead  ~2/2 falling to the floor  ~radiology workup neg for fracture or head bleed    Disposition: Admitted 2/2 atrial fibrillation with self conversion to S. Margarita. Cardiology consult.    I spent 45 minutes in the professional and overall care of this patient.      DIVINE Sandy-CNP  Internal Medicine  Attending Attestation:    Patient was seen and examined face to face, history and physical was taken personally at bedside the APRN-CNP, was present for the whole duration of the exam who participated in the documentation of this note. I performed the medical decision-making components (assessment and plan of care). I have reviewed the documentation and verified the findings in the note as written with additions or exceptions as stated in the body of this note.  Patient had a syncopal episode, fell and hit his forehead, was brought to emergency room he was found to be in atrial fibrillation.  But he converted spontaneously, but there is twelve-lead EKG documentation of atrial fibrillation while patient was in the emergency room.  He was seen this morning there is a female person is in the room, she says that patient has been having fluttering in his chest in the past, the patient denies having any chest pain except having some right shoulder pain, no fever chills no nausea vomiting no previous syncopal episode.  On exam there is a small  laceration over forehead above left eyebrow, there is a possible nodule on the left thyroid gland, heart is regular lungs clear to auscultation with scattered rhonchi abdomen soft bowel sounds are present extremity no edema.  There is tenderness over anterior part of the chest, no bruises.  Patient with syncopal episode possibly secondary to A-fib, placed patient in hospital get 2D echo, cardiology consult, trending troponin, check TSH and get ultrasound of thyroid.    Dr. Shannan Owen MD  Internal Medicine        [1] History reviewed. No pertinent past medical history.  [2] History reviewed. No pertinent surgical history.  [3] enoxaparin, 40 mg, subcutaneous, q24h  polyethylene glycol, 17 g, oral, Daily  [4]    [5] PRN medications: acetaminophen, calcium carbonate, ondansetron ODT **OR** ondansetron

## 2025-05-29 VITALS
DIASTOLIC BLOOD PRESSURE: 71 MMHG | HEART RATE: 61 BPM | WEIGHT: 163.58 LBS | HEIGHT: 72 IN | BODY MASS INDEX: 22.16 KG/M2 | OXYGEN SATURATION: 95 % | SYSTOLIC BLOOD PRESSURE: 128 MMHG | TEMPERATURE: 97.9 F | RESPIRATION RATE: 16 BRPM

## 2025-05-29 DIAGNOSIS — R07.9 ACUTE CHEST PAIN: ICD-10-CM

## 2025-05-29 DIAGNOSIS — R55 SYNCOPE, UNSPECIFIED SYNCOPE TYPE: ICD-10-CM

## 2025-05-29 DIAGNOSIS — I48.91 NEW ONSET ATRIAL FIBRILLATION (MULTI): ICD-10-CM

## 2025-05-29 LAB
ANION GAP SERPL CALC-SCNC: 7 MMOL/L (ref 10–20)
BUN SERPL-MCNC: 18 MG/DL (ref 6–23)
CALCIUM SERPL-MCNC: 9 MG/DL (ref 8.6–10.3)
CHLORIDE SERPL-SCNC: 106 MMOL/L (ref 98–107)
CO2 SERPL-SCNC: 28 MMOL/L (ref 21–32)
CREAT SERPL-MCNC: 0.99 MG/DL (ref 0.5–1.3)
EGFRCR SERPLBLD CKD-EPI 2021: 82 ML/MIN/1.73M*2
ERYTHROCYTE [DISTWIDTH] IN BLOOD BY AUTOMATED COUNT: 12.9 % (ref 11.5–14.5)
EST. AVERAGE GLUCOSE BLD GHB EST-MCNC: 117 MG/DL
GLUCOSE SERPL-MCNC: 98 MG/DL (ref 74–99)
HBA1C MFR BLD: 5.7 % (ref ?–5.7)
HCT VFR BLD AUTO: 45.5 % (ref 41–52)
HGB BLD-MCNC: 15 G/DL (ref 13.5–17.5)
MCH RBC QN AUTO: 31 PG (ref 26–34)
MCHC RBC AUTO-ENTMCNC: 33 G/DL (ref 32–36)
MCV RBC AUTO: 94 FL (ref 80–100)
NRBC BLD-RTO: 0 /100 WBCS (ref 0–0)
PLATELET # BLD AUTO: 233 X10*3/UL (ref 150–450)
POTASSIUM SERPL-SCNC: 4.2 MMOL/L (ref 3.5–5.3)
RBC # BLD AUTO: 4.84 X10*6/UL (ref 4.5–5.9)
SODIUM SERPL-SCNC: 137 MMOL/L (ref 136–145)
WBC # BLD AUTO: 8.1 X10*3/UL (ref 4.4–11.3)

## 2025-05-29 PROCEDURE — 94760 N-INVAS EAR/PLS OXIMETRY 1: CPT

## 2025-05-29 PROCEDURE — 85027 COMPLETE CBC AUTOMATED: CPT | Performed by: NURSE PRACTITIONER

## 2025-05-29 PROCEDURE — 99239 HOSP IP/OBS DSCHRG MGMT >30: CPT | Performed by: NURSE PRACTITIONER

## 2025-05-29 PROCEDURE — 80048 BASIC METABOLIC PNL TOTAL CA: CPT | Performed by: NURSE PRACTITIONER

## 2025-05-29 PROCEDURE — G0378 HOSPITAL OBSERVATION PER HR: HCPCS

## 2025-05-29 PROCEDURE — 36415 COLL VENOUS BLD VENIPUNCTURE: CPT | Performed by: NURSE PRACTITIONER

## 2025-05-29 PROCEDURE — 99222 1ST HOSP IP/OBS MODERATE 55: CPT | Performed by: INTERNAL MEDICINE

## 2025-05-29 ASSESSMENT — PAIN SCALES - GENERAL: PAINLEVEL_OUTOF10: 0 - NO PAIN

## 2025-05-29 NOTE — CARE PLAN
The patient's goals for the shift include      The clinical goals for the shift include Patient will have no episodes of syncope through 1900    Patient with uneventful morning. SBA, ambulates ad kelton. SBA due to syncople episode while at home. No episodes noted here at hospital. Patient reports feeling good and ready to discharge. Reviewed discharge instructions with both patient and wife. Dr. Leija cardiology  consulted today. Discharging home, patient to follow up out patient.

## 2025-05-29 NOTE — CONSULTS
Consults  This is a virtual consult.  A secure audio communication was established with the patient.  Patient consented for this visit.    Chief complaint: Syncope    History Of Present Illness:    Titi Rowley is a 69 y.o. male presenting with paroxysmal palpitations, followed by a syncope, causing a scalp injury.  In the ER, ECG showed atrial fibrillation with a fast ventricular response and spontaneous conversion back to sinus rhythm, echocardiogram returning preserved LV systolic function without any significant valvular abnormalities.  He was otherwise fine without any shortness of breath, swelling of feet or any further episode of palpitations.  He had reproducible chest pain from the fall.  Of he is a smoker.  He denies any history of high blood pressure, diabetes or high cholesterol.  He has family history of heart rhythm problems, with the father having a pacemaker, mother having had atrial fibrillation, as well as siblings.     Last Recorded Vitals:  Vitals:    05/28/25 2139 05/29/25 0000 05/29/25 0400 05/29/25 0810   BP:  128/83 125/76 152/86   BP Location:  Right arm Right arm Right arm   Patient Position:  Lying Lying Lying   Pulse:  (!) 49 53 56   Resp:  15 15 16   Temp:  36.7 °C (98.1 °F) 36.7 °C (98.1 °F) 36.6 °C (97.9 °F)   TempSrc:  Temporal Temporal Temporal   SpO2: 96% 96% 96% 95%   Weight:       Height:           Last Labs:  CBC - 5/29/2025:  6:04 AM  8.1 15.0 233    45.5      CMP - 5/29/2025:  6:04 AM  9.0 6.3 24 --- 0.7   _ 4.4 17 84      PTT - 5/28/2025:  4:25 AM  1.0   10.9 30     Troponin I, High Sensitivity   Date/Time Value Ref Range Status   05/28/2025 05:17 AM 5 0 - 20 ng/L Final   05/28/2025 04:25 AM 5 0 - 20 ng/L Final     Hemoglobin A1C   Date/Time Value Ref Range Status   05/28/2025 04:25 AM 5.7 (H) See comment % Final     LDL Calculated   Date/Time Value Ref Range Status   04/22/2024 09:43  (H) <=99 mg/dL Final     Comment:                                 Near   Borderline       AGE      Desirable  Optimal    High     High     Very High     0-19 Y     0 - 109     ---    110-129   >/= 130     ----    20-24 Y     0 - 119     ---    120-159   >/= 160     ----      >24 Y     0 -  99   100-129  130-159   160-189     >/=190       VLDL   Date/Time Value Ref Range Status   04/22/2024 09:43 AM 12 0 - 40 mg/dL Final   08/13/2022 09:48 AM 12 0 - 40 mg/dL Final      Last I/O:  I/O last 3 completed shifts:  In: 360 (4.9 mL/kg) [P.O.:360]  Out: - (0 mL/kg)   Weight: 74.2 kg     Past Cardiology Tests (Last 3 Years):  EKG:  ECG 12 lead 05/28/2025  AF RVR  SR WNL        Echo:  Transthoracic Echo Complete 05/28/2025    Ejection Fractions:  EF   Date/Time Value Ref Range Status   05/28/2025 02:30 PM 68 %        Past Medical History:  History of paroxysmal palpitations.  No history of hypertension, dyslipidemia or diabetes mellitus.        Social History:  He reports that he has been smoking cigarettes. He does not have any smokeless tobacco history on file. He reports that he does not drink alcohol and does not use drugs.    Family History:  Father had pacemaker.  Mother had atrial fibrillation.  2 siblings also had atrial fibrillation.     Allergies:  Patient has no known allergies.      Assessment/Plan   Syncope  Paroxysmal atrial fibrillation  Otherwise clinically normal CVS  Smoking    Recommend:  - Oral anticoagulation with apixaban 5 mg twice daily  - Smoking cessation counseling  - Outpatient event monitor for assessing the burden of A-fib  - Outpatient treadmill stress test to rule out any underlying significant CAD  - If the stress test is negative, we will initiate him on flecainide 100 mg twice twice daily.  If he has any breakthrough spells on antiarrhythmic drugs, we will consider ablation therapy  - Patient may be discharged home and we will contact him for further testing and follow-up    Thanks for the consult  Please call with any questions  Peripheral IV 05/28/25 20 G Anterior;Left;Upper  Arm (Active)   Site Assessment Clean;Dry;Intact 05/29/25 0833   Dressing Status Clean;Dry;Occlusive 05/29/25 0833   Number of days: 1       Code Status:  Full Code    I spent 40 minutes in the professional and overall care of this patient.        Kelle Leija MD

## 2025-05-29 NOTE — CARE PLAN
The patient's goals for the shift include      The clinical goals for the shift include Pt will remain hemodynamically stable this shift    Over the shift, the patient had an uneventful shift  VSS  c/o right shoulder and ribcage discomfort  Declined need for pain management interventions this shift  Telemetry displays Sinus Howard this shift  ( 46 - 50's)  Ambulating to bathroom with SBA  Tolerates well  No c/o dizziness  Call light in reach  Safety measures maintained

## 2025-05-29 NOTE — DISCHARGE SUMMARY
Discharge Diagnosis  Syncope, unspecified syncope type  New on set atrial fibrillation           Issues Requiring Follow-Up      Discharge Meds     Medication List      START taking these medications     apixaban 5 mg tablet; Commonly known as: Eliquis; Take 1 tablet (5 mg)   by mouth every 12 hours.       Test Results Pending At Discharge  Pending Labs       No current pending labs.         69 y.o. male  with PMHx significant for nicotine dependence who presented to [facility] due to syncope. Patient expressed that he was sleeping on the couch when he got up to go to the bathroom, became dizzy and then the next he knew, he woke up on the floor. He did strike his head causing a laceration and has right sided shoulder and rib pain that is reproducible with palpation and movement. Patient and female in the room expressed that he has been having intermittent fluttering feelings in his chest recently. Patient denies fever, chills, recent travel, or sick contacts. Patient currently does not take any medications and has not had a cardiac work up in about 20 years.   On arrival to ER, he was found to be in atrial fibrillation. He did self convert about 45 minutes after arrival without intervention. His Ed work up is benign including a CMP, CBC, TSH, CT head, CXR, and right shoulder x-ray. Monitor has been SB since conversion. Cardiology consult, ECHO, thyroid US ordered on admission.     Hospital Course  Atrial fibrillation, new onset  ~notes increased fluttering in his chest that is intermittent  ~symptomatic with syncope  ~self converted to S.margarita  ~ECHO: normal LVSF with an EF of 65-70%  ~thyroid US pending; TSH normal  ~cardiology consult appreciated  ~started apixaban 5 mg BID     Right shoulder/chest pain  Abrasion left forehead  ~2/2 falling to the floor  ~radiology workup neg for fracture or head bleed     Disposition: Patient was stable to be discharged to home. He will follow up with cardiology for a Leida  cardiac monitor and a stress test. He will follow up with his PCP. He was discharged on apixaban 5 mg BID.    Total cumulative time spent in preparation of this discharge including documentation review, coordination of care with the medical team including PT/SW/care coordinators and treating consultants, discussion with patient and pertinent family members and finalization of prescriptions, follow-up appointments, and this discharge summary was approximately 45 minutes.     Pertinent Physical Exam At Time of Discharge  Physical Exam  Vitals reviewed.   Constitutional:       Appearance: Normal appearance. He is normal weight.   HENT:      Head: Normocephalic and atraumatic.      Right Ear: External ear normal.      Left Ear: External ear normal.      Nose: Nose normal.      Mouth/Throat:      Mouth: Mucous membranes are moist.      Pharynx: Oropharynx is clear.   Eyes:      Conjunctiva/sclera: Conjunctivae normal.      Pupils: Pupils are equal, round, and reactive to light.   Cardiovascular:      Rate and Rhythm: Regular rhythm. Bradycardia present.      Pulses: Normal pulses.      Heart sounds: Normal heart sounds.   Pulmonary:      Effort: Pulmonary effort is normal.      Breath sounds: Normal breath sounds.   Abdominal:      General: Bowel sounds are normal.      Palpations: Abdomen is soft.   Musculoskeletal:         General: Normal range of motion.      Cervical back: Normal range of motion and neck supple.   Skin:     General: Skin is warm and dry.   Neurological:      General: No focal deficit present.      Mental Status: He is alert and oriented to person, place, and time.   Psychiatric:         Mood and Affect: Mood normal.         Behavior: Behavior normal.       Outpatient Follow-Up  Future Appointments   Date Time Provider Department Center   6/19/2025  9:30 AM GEN NM ADMIN ROOM 1 [facility]   6/19/2025 10:00 AM GEN NM 1 [facility]   6/19/2025 10:30 AM GEN [facility] [place] [address]    6/19/2025 11:00 AM GEN NM ADMIN ROOM 1 [facility]   6/19/2025 11:15 AM GEN NM 1 [facility]   6/19/2025  1:00 PM [facility]   8/6/2025  9:00 AM NEHA [facility] [id number] [facility]         DIVINE Truong-CNP

## 2025-06-19 ENCOUNTER — HOSPITAL ENCOUNTER (OUTPATIENT)
Dept: CARDIOLOGY | Facility: HOSPITAL | Age: 70
Discharge: HOME | End: 2025-06-19
Payer: MEDICARE

## 2025-06-19 ENCOUNTER — HOSPITAL ENCOUNTER (OUTPATIENT)
Dept: RADIOLOGY | Facility: HOSPITAL | Age: 70
Discharge: HOME | End: 2025-06-19
Payer: MEDICARE

## 2025-06-19 DIAGNOSIS — R07.9 ACUTE CHEST PAIN: ICD-10-CM

## 2025-06-19 DIAGNOSIS — R55 SYNCOPE, UNSPECIFIED SYNCOPE TYPE: ICD-10-CM

## 2025-06-19 DIAGNOSIS — I48.91 NEW ONSET ATRIAL FIBRILLATION (MULTI): ICD-10-CM

## 2025-06-19 PROCEDURE — 93017 CV STRESS TEST TRACING ONLY: CPT

## 2025-06-19 PROCEDURE — 93016 CV STRESS TEST SUPVJ ONLY: CPT | Performed by: INTERNAL MEDICINE

## 2025-06-19 PROCEDURE — A9502 TC99M TETROFOSMIN: HCPCS | Performed by: INTERNAL MEDICINE

## 2025-06-19 PROCEDURE — 78452 HT MUSCLE IMAGE SPECT MULT: CPT

## 2025-06-19 PROCEDURE — 93018 CV STRESS TEST I&R ONLY: CPT | Performed by: INTERNAL MEDICINE

## 2025-06-19 PROCEDURE — 78452 HT MUSCLE IMAGE SPECT MULT: CPT | Performed by: STUDENT IN AN ORGANIZED HEALTH CARE EDUCATION/TRAINING PROGRAM

## 2025-06-19 PROCEDURE — 3430000001 HC RX 343 DIAGNOSTIC RADIOPHARMACEUTICALS: Performed by: INTERNAL MEDICINE

## 2025-06-19 PROCEDURE — 93246 EXT ECG>7D<15D RECORDING: CPT

## 2025-06-19 RX ADMIN — TETROFOSMIN 10.5 MILLICURIE: 0.23 INJECTION, POWDER, LYOPHILIZED, FOR SOLUTION INTRAVENOUS at 09:38

## 2025-06-19 RX ADMIN — TETROFOSMIN 34 MILLICURIE: 0.23 INJECTION, POWDER, LYOPHILIZED, FOR SOLUTION INTRAVENOUS at 11:04

## 2025-06-25 ENCOUNTER — TELEPHONE (OUTPATIENT)
Dept: CARDIOLOGY | Facility: CLINIC | Age: 70
End: 2025-06-25
Payer: MEDICARE

## 2025-06-25 DIAGNOSIS — I48.91 NEW ONSET ATRIAL FIBRILLATION (MULTI): ICD-10-CM

## 2025-06-25 NOTE — TELEPHONE ENCOUNTER
----- Message from Elisa GALINDO sent at 6/24/2025  1:58 PM EDT -----  Pt needs to hold Eliquis 3 days prior , dental procedure July 2 [facility]

## 2025-07-02 ENCOUNTER — APPOINTMENT (OUTPATIENT)
Dept: CARDIOLOGY | Facility: CLINIC | Age: 70
End: 2025-07-02
Payer: MEDICARE

## 2025-08-06 ENCOUNTER — OFFICE VISIT (OUTPATIENT)
Dept: CARDIOLOGY | Facility: CLINIC | Age: 70
End: 2025-08-06
Payer: MEDICARE

## 2025-08-06 ENCOUNTER — APPOINTMENT (OUTPATIENT)
Dept: CARDIOLOGY | Facility: CLINIC | Age: 70
End: 2025-08-06
Payer: MEDICARE

## 2025-08-06 ENCOUNTER — HOSPITAL ENCOUNTER (OUTPATIENT)
Dept: CARDIOLOGY | Facility: HOSPITAL | Age: 70
Discharge: HOME | End: 2025-08-06
Payer: MEDICARE

## 2025-08-06 VITALS
BODY MASS INDEX: 21.92 KG/M2 | WEIGHT: 161.82 LBS | DIASTOLIC BLOOD PRESSURE: 81 MMHG | OXYGEN SATURATION: 99 % | SYSTOLIC BLOOD PRESSURE: 144 MMHG | HEART RATE: 83 BPM | HEIGHT: 72 IN

## 2025-08-06 DIAGNOSIS — R03.0 ELEVATED BLOOD PRESSURE READING: Primary | ICD-10-CM

## 2025-08-06 DIAGNOSIS — I48.91 NEW ONSET ATRIAL FIBRILLATION (MULTI): ICD-10-CM

## 2025-08-06 DIAGNOSIS — R79.89 ABNORMAL TSH: ICD-10-CM

## 2025-08-06 LAB
ATRIAL RATE: 74 BPM
P AXIS: 72 DEGREES
P OFFSET: 202 MS
P ONSET: 141 MS
PR INTERVAL: 152 MS
Q ONSET: 217 MS
QRS COUNT: 12 BEATS
QRS DURATION: 100 MS
QT INTERVAL: 372 MS
QTC CALCULATION(BAZETT): 412 MS
QTC FREDERICIA: 399 MS
R AXIS: 78 DEGREES
T AXIS: 56 DEGREES
T OFFSET: 403 MS
VENTRICULAR RATE: 74 BPM

## 2025-08-06 PROCEDURE — 93005 ELECTROCARDIOGRAM TRACING: CPT

## 2025-08-06 PROCEDURE — 1159F MED LIST DOCD IN RCRD: CPT | Performed by: NURSE PRACTITIONER

## 2025-08-06 PROCEDURE — 99214 OFFICE O/P EST MOD 30 MIN: CPT | Performed by: NURSE PRACTITIONER

## 2025-08-06 PROCEDURE — 3008F BODY MASS INDEX DOCD: CPT | Performed by: NURSE PRACTITIONER

## 2025-08-07 LAB — TSH SERPL-ACNC: 1.44 MIU/L (ref 0.4–4.5)

## 2025-08-11 PROBLEM — R03.0 ELEVATED BLOOD PRESSURE READING: Status: ACTIVE | Noted: 2025-08-11

## 2025-08-11 PROBLEM — R79.89 ABNORMAL TSH: Status: ACTIVE | Noted: 2025-08-11

## 2025-08-12 RX ORDER — FLECAINIDE ACETATE 150 MG/1
300 TABLET ORAL ONCE AS NEEDED
Qty: 60 TABLET | Refills: 11 | Status: SHIPPED | OUTPATIENT
Start: 2025-08-12 | End: 2025-09-11

## 2025-09-02 ENCOUNTER — APPOINTMENT (OUTPATIENT)
Dept: PHARMACY | Facility: HOSPITAL | Age: 70
End: 2025-09-02
Payer: MEDICARE

## 2025-09-11 ENCOUNTER — APPOINTMENT (OUTPATIENT)
Dept: PHARMACY | Facility: HOSPITAL | Age: 70
End: 2025-09-11
Payer: MEDICARE